# Patient Record
Sex: FEMALE | Race: WHITE
[De-identification: names, ages, dates, MRNs, and addresses within clinical notes are randomized per-mention and may not be internally consistent; named-entity substitution may affect disease eponyms.]

---

## 2018-03-05 ENCOUNTER — HOSPITAL ENCOUNTER (EMERGENCY)
Dept: HOSPITAL 44 - ED | Age: 68
Discharge: HOME | End: 2018-03-05
Payer: MEDICARE

## 2018-03-05 VITALS — DIASTOLIC BLOOD PRESSURE: 79 MMHG | SYSTOLIC BLOOD PRESSURE: 155 MMHG
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
## 2018-03-05 DIAGNOSIS — R55: Primary | ICD-10-CM
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
## 2018-03-05 LAB
BASOPHILS NFR BLD: 0.8 % (ref 0–1.5)
EGFR (AFRICAN): > 60
EGFR (NON-AFRICAN): > 60
EOSINOPHIL NFR BLD: 3.2 % (ref 0–6.8)
MCH RBC QN AUTO: 32.8 PG (ref 28–34)
MCV RBC AUTO: 95.2 FL (ref 80–100)
MONOCYTES %: 5.2 % (ref 0–11)
NEUTROPHILS #: 4.1 # K/UL (ref 1.4–7.7)

## 2018-03-05 PROCEDURE — 93005 ELECTROCARDIOGRAM TRACING: CPT

## 2018-03-05 PROCEDURE — 99283 EMERGENCY DEPT VISIT LOW MDM: CPT

## 2018-03-05 PROCEDURE — 99284 EMERGENCY DEPT VISIT MOD MDM: CPT

## 2018-03-05 PROCEDURE — 84484 ASSAY OF TROPONIN QUANT: CPT

## 2018-03-05 PROCEDURE — 80053 COMPREHEN METABOLIC PANEL: CPT

## 2018-03-05 PROCEDURE — 85025 COMPLETE CBC W/AUTO DIFF WBC: CPT

## 2018-03-05 PROCEDURE — 70450 CT HEAD/BRAIN W/O DYE: CPT

## 2018-03-05 PROCEDURE — S1016 NON-PVC INTRAVENOUS ADMINIST: HCPCS

## 2018-03-05 RX ADMIN — HYDROCODONE BITARTRATE AND ACETAMINOPHEN ONE EACH: 5; 325 TABLET ORAL at 17:45

## 2018-03-05 RX ADMIN — ASPIRIN 81 MG ONE MG: 81 TABLET ORAL at 15:38

## 2018-03-05 NOTE — DIAGNOSTIC IMAGING REPORT
HESHAM CASH 

[facility]

[address] [facility] [address]

[place], [place]. [address]

 

 

 

 

Report Submission Date: Mar 5, 2018 4:00:09 PM CST

Patient       Study

Name:   BRANDON BROWNING L       Date:   Mar 5, 2018 3:41:43 PM CST

MRN:   [id number]       Modality Type:   CT\SR

Gender:   F       Description:   CT BRAIN W/O CONTRAST

:   [birth year]       Institution:   [facility]

Physician:   HESHAM CASH

     Accession:    [id number]

 

 

Examination: CT head without contrast 



History: PT STATES HEADACHE AFTER FALL X 4 HRS AGO (Hx) 



Comparison exam: None available 



Technique: Noncontrast head CT protocol. 



Findings: Ventricles and sulci are prominent. Cerebrocerebellar parenchyma 
demonstrates periventricular low attenuation consistent with small vessel 
disease. No evidence for parenchymal hemorrhage. No evidence for mass or mass 
effect. No midline shift. No extra axial fluid collections. Partial 
visualization of the paranasal sinuses, mastoid air cells, orbits, skull and 
scalp without gross irregularity. 



Impression: Frontal lobe atrophy and age related changes. No acute parenchymal 
process. No hemorrhage.

 

Electronically signed on Mar 5, 2018 4:00:09 PM CST by:

Miky ROWELL

## 2018-03-05 NOTE — ED PHYSICIAN DOCUMENTATION
Altered Mental Status





- HISTORIAN


Historian: patient, paramedics





- HPI


Stated Complaint: chest pain


Chief Complaint: Altered Mental Status


Additional Information: 





pt at [facility] fgell w/poss syncope c/o mc but hit head when fell also sl 

confused.  amb was tnsf to [facility] but pt coughed aspieated poss w/ resp distress 

resolved on arrival here.   pt c/o lt thoracic cp which reproduced on pal 

pation.  =- c/o mc lt frontal area describes as usual mc but slow to answer 

questions and sl confused in appearance re date president location etc.  pt 

does not remember fall or being sick at [facility] but remembers the cp at [facility].  

ate no breakfast had chinese at noon versailes dont remember when arr [facility].


Onset: hours (1345)


Duration: sudden onset


Last known Well Date: 03/05/18


Last Known Well Time: 12:00


Last known Well Code/Unknown Code: Known


Character of Altered Mental Status: confused (slightly-knows cell phone w/niece 

who is on way to ed at [facility])


Cognition is Usually: alert but confused


Associated Symptoms: chest pain, falling, headache.  denies: fever, chills, 

seizure





- ROS


EYES/ENT: denies: problems with vision, sore throat, trouble swallowing


CVS/RESP: chest pain.  denies: shortness of breath, palpitations, cough


GI/: none


MS/SKIN/LYMPH: none


NEURO/PSYCH: headache (ihd  [facility] copd  chrons  cirrholsis liver)





- PAST HX


Past History: other (IHD   CHRONS  CIRRHOSIS   [facility])


Allergies/Adverse Reactions: 


 Allergies











Allergy/AdvReac Type Severity Reaction Status Date / Time


 


codeine Allergy   Verified 03/05/18 15:20


 


Penicillins Allergy   Verified 03/05/18 15:20














Home Medications: 


 Ambulatory Orders











 Medication  Instructions  Recorded


 


Unobtainable [Unobtainable]  03/05/18














- SOCIAL HX


Smoking History: cigarettes


Alcohol Use: none


Drug Use: none





- FAMILY HX


Family History: no significant history





- VITAL SIGNS


Vital Signs: 


 Vital Signs











Temp Pulse Resp BP Pulse Ox


 


 98.6 F   66   20   148/78   94 


 


 03/05/18 14:52  03/05/18 14:52  03/05/18 14:52  03/05/18 14:52  03/05/18 15:14














- REVIEWED ASSESSMENTS


Nursing Assessment  Reviewed: Yes


Vitals Reviewed: Yes





Progress





- Results/Orders


Results/Orders: 





pt prefers be evaluated by [facility]-I called admissions tnsf to ED  spoke w/ 

DR BADILLO.  he stated felt i had donw what was neceswsary but if she wanted 

furthur evaluation she was welcome to come  and would be triaged and evaluated.

  spoke w/pt she will be dismissed and may go by pvt as preferred.  condition 

appears sstable and she is coherent as lper clin exam and per her and her 

cousins assesment





ED Results Lab/Radiology





- Lab Results


Lab Results: 


 Lab Results











  03/05/18 03/05/18 03/05/18





  15:15 15:15 15:15


 


WBC    6.10 K/ul K/ul  





    (4.00-12.00)  


 


RBC    4.36 M/ul M/ul  





    (3.90-5.20)  


 


Hgb    14.3 g/dL g/dL  





    (12.0-16.0)  


 


Hct    41.5 % %  





    (34.5-46.5)  


 


MCV    95.2 fl fl  





    (80.0-100.0)  


 


MCH    32.8 pg pg  





    (28.0-34.0)  


 


MCHC    34.4 g/dL g/dL  





    (30.0-36.0)  


 


RDW    13.7 % %  





    (11.3-14.3)  


 


Plt Count    108 K/mm3 L K/mm3  





    (130-400)  


 


Neut % (Auto)    68.1 % %  





    (39.0-79.0)  


 


Lymph % (Auto)    21.9 % %  





    (16.0-50.0)  


 


Mono % (Auto)    5.2 % %  





    (0.0-11.0)  


 


Eos % (Auto)    3.2 % %  





    (0.0-6.8)  


 


Baso % (Auto)    0.8   





    (0.0-1.5)  


 


Neut # (Auto)    4.1 # k/uL # k/uL  





    (1.4-7.7)  


 


Lymph # (Auto)    1.3 # k/uL # k/uL  





    (0.6-4.0)  


 


Mono # (Auto)    0.3 # k/uL # k/uL  





    (0.0-0.9)  


 


Eos # (Auto)    0.2 # k/uL # k/uL  





    (0.0-0.6)  


 


Baso # (Auto)    0.0 # k/uL # k/uL  





    (0.0-0.5)  


 


Reactive Lymphs %    0.8 % %  





    (0.0-5.0)  


 


Reactive Lymphs #    0.0 # k/uL # k/uL  





    (0.0-0.8)  


 


Sodium  145 mmol/L mmol/L    





   (136-145)   


 


Potassium  3.7 mmol/L mmol/L    





   (3.5-5.1)   


 


Chloride  106 mmol/L mmol/L    





   ()   


 


Carbon Dioxide  27 mmol/L mmol/L    





   (22-30)   


 


BUN  10 mg/dL mg/dL    





   (7-17)   


 


Creatinine  1.10 mg/dL H mg/dL    





   (0.52-1.04)   


 


Estimated Creat Clear  98     





    


 


Est GFR ( Amer)  > 60     





  (60 - )  


 


Est GFR (Non-Af Amer)  > 60     





  (60 - )  


 


Glucose  138 mg/dL H mg/dL    





   ()   


 


Calcium  9.1 mg/dL mg/dL    





   (8.4-10.2)   


 


Total Bilirubin  1.0 mg/dL mg/dL    





   (0.2-1.3)   


 


AST  27 U/L U/L    





   (15-46)   


 


ALT  24 U/L U/L    





   (13-69)   


 


Alkaline Phosphatase  113 U/L U/L    





   ()   


 


Troponin I      < 0.03 ng/mL L ng/mL





     (0.03-0.06) 


 


Total Protein  6.8 g/dL g/dL    





   (6.3-8.2)   


 


Albumin  3.6 g/dL g/dL    





   (3.5-5.0)   














- Orders


Orders: 


 ED Orders











 Category Date Time Status


 


 Continuous Pulse Oximetry Q30M Care  03/05/18 15:14 Active


 


 Place IV Lock 1T Care  03/05/18 15:14 Active


 


 CT BRAIN W/O CONTRAST Stat Exams  03/05/18 Completed


 


 ARTERIAL BLOOD GAS Stat Lab  03/05/18 Uncollected


 


 CBC/PLATELET/DIFF Routine Lab  03/05/18 15:15 Completed


 


 CMP Routine Lab  03/05/18 15:15 Completed


 


 TROPONIN I (cTnI) Stat Lab  03/05/18 15:15 Completed


 


 Aspirin Med  03/05/18 15:14 Discontinued





 324 mg PO NOW ONE   


 


 HYDROcodone /APAP 5/325 [Norco 5/325] Med  03/05/18 17:26 Once





 1 each PO NOW ONE   


 


 EKG WITH COMPARISON Stat Ther  03/05/18 15:14 Completed














Altered Mental Status Physical





- Physical Exam


General Appearance: mild distress, moderate distress


Neuro/Psych: headache


alert, other (SLOW TO RESPOND TO QUESTIONS).  No: mood/affect nml


Peripheral Exam: motor nml, sensation nml, reflexes nml.  No: weakness, 

hemiparesis


HEENT: CELESTINO, EOM's intact, no apparent trauma, airway intact


Neck: normal inspection.  No: carotid bruit


Respiratory: no resp distress.  No: chest non-tender (palp reprod chest pain)


CVS: reg rate & rhythm, heart sounds normal


Skin: warm/dry, normal color.  No: cyanosis, jaundice


Extremities: non-tender, normal range of motion, no evidence of injury, no edema





Discharge


Clincal Impression: 


 fall w contusion head , hx migraine ceph,  slight mental altered on admission-

reso





Referrals: 


Primary Doctor,No [Primary Care Provider] - 2 Days


Condition: Good


Disposition: 01 HOME, SELF-CARE


Decision to Admit: NO


Decision Time: 17:47